# Patient Record
Sex: MALE
[De-identification: names, ages, dates, MRNs, and addresses within clinical notes are randomized per-mention and may not be internally consistent; named-entity substitution may affect disease eponyms.]

---

## 2019-09-20 ENCOUNTER — HOSPITAL ENCOUNTER (EMERGENCY)
Dept: HOSPITAL 92 - ERS | Age: 34
Discharge: HOME | End: 2019-09-20
Payer: SELF-PAY

## 2019-09-20 DIAGNOSIS — K80.20: Primary | ICD-10-CM

## 2019-09-20 LAB
ALBUMIN SERPL BCG-MCNC: 4.5 G/DL (ref 3.5–5)
ALP SERPL-CCNC: 88 U/L (ref 40–150)
ALT SERPL W P-5'-P-CCNC: 104 U/L (ref 8–55)
ANION GAP SERPL CALC-SCNC: 16 MMOL/L (ref 10–20)
AST SERPL-CCNC: 63 U/L (ref 5–34)
BASOPHILS # BLD AUTO: 0 THOU/UL (ref 0–0.2)
BASOPHILS NFR BLD AUTO: 0.5 % (ref 0–1)
BILIRUB SERPL-MCNC: 0.3 MG/DL (ref 0.2–1.2)
BUN SERPL-MCNC: 13 MG/DL (ref 8.9–20.6)
CALCIUM SERPL-MCNC: 9.4 MG/DL (ref 7.8–10.44)
CHLORIDE SERPL-SCNC: 105 MMOL/L (ref 98–107)
CO2 SERPL-SCNC: 23 MMOL/L (ref 22–29)
CREAT CL PREDICTED SERPL C-G-VRATE: 0 ML/MIN (ref 70–130)
EOSINOPHIL # BLD AUTO: 0.2 THOU/UL (ref 0–0.7)
EOSINOPHIL NFR BLD AUTO: 1.7 % (ref 0–10)
GLOBULIN SER CALC-MCNC: 3.3 G/DL (ref 2.4–3.5)
GLUCOSE SERPL-MCNC: 135 MG/DL (ref 70–105)
HGB BLD-MCNC: 14.7 G/DL (ref 14–18)
LIPASE SERPL-CCNC: 22 U/L (ref 8–78)
LYMPHOCYTES # BLD: 3.3 THOU/UL (ref 1.2–3.4)
LYMPHOCYTES NFR BLD AUTO: 33.8 % (ref 21–51)
MCH RBC QN AUTO: 32.3 PG (ref 27–31)
MCV RBC AUTO: 97 FL (ref 78–98)
MONOCYTES # BLD AUTO: 0.7 THOU/UL (ref 0.11–0.59)
MONOCYTES NFR BLD AUTO: 6.9 % (ref 0–10)
NEUTROPHILS # BLD AUTO: 5.6 THOU/UL (ref 1.4–6.5)
NEUTROPHILS NFR BLD AUTO: 57.2 % (ref 42–75)
PLATELET # BLD AUTO: 186 THOU/UL (ref 130–400)
POTASSIUM SERPL-SCNC: 3.9 MMOL/L (ref 3.5–5.1)
RBC # BLD AUTO: 4.56 MILL/UL (ref 4.7–6.1)
SODIUM SERPL-SCNC: 140 MMOL/L (ref 136–145)
WBC # BLD AUTO: 9.8 THOU/UL (ref 4.8–10.8)

## 2019-09-20 PROCEDURE — 80053 COMPREHEN METABOLIC PANEL: CPT

## 2019-09-20 PROCEDURE — 81003 URINALYSIS AUTO W/O SCOPE: CPT

## 2019-09-20 PROCEDURE — 96375 TX/PRO/DX INJ NEW DRUG ADDON: CPT

## 2019-09-20 PROCEDURE — 85025 COMPLETE CBC W/AUTO DIFF WBC: CPT

## 2019-09-20 PROCEDURE — 83690 ASSAY OF LIPASE: CPT

## 2019-09-20 PROCEDURE — 96374 THER/PROPH/DIAG INJ IV PUSH: CPT

## 2019-09-20 PROCEDURE — 76705 ECHO EXAM OF ABDOMEN: CPT

## 2019-09-20 PROCEDURE — 96361 HYDRATE IV INFUSION ADD-ON: CPT

## 2019-09-20 NOTE — ULT
PRELIMINARY REPORT/[facility]/EMERGENCY AFTER

HOURS PROCEDURE: 

 

PROCEDURE INFORMATION:

Exam: US Abdomen Limited, Right Upper Quadrant

 

Exam date and time: 9/20/2019 3:12 AM

Clinical history: 34 years old, male; Abdominal pain; Localized; Right upper quadrant (ruq)

 

TECHNIQUE:

Imaging protocol: Real-time ultrasound of the abdomen with image documentation. Examination was focus
ed on the right upper quadrant.

 

COMPARISON:

No relevant prior studies available.

 

FINDINGS:

Liver: Hepatic steatosis.

Gallbladder: Sonographic Quevedo sign negative. Cholelithiasis/Gallbladder sludge . No gallbladder

wall thickening or pericholecystic fluid.

Common bile duct: See Right Kidney Finding.

Pancreas: Visualized pancreas is unremarkable.

Right kidney: Mild distention of the right renal pelvis may signify mild hydronephrosis. No discernib
le

calyceal dilatation.

 

IMPRESSION:

1. Cholelithiasis/gallbladder sludge. No cholecystitis.

2. Mild distention of the right renal pelvis may signify mild hydronephrosis. No discernible calyceal
 dilatation.

 

Thank you for allowing us to participate in the care of your patient.

Dictated and Authenticated by: Loc Paredes MD

09/20/2019 3:40 AM Central Time ([place] & [place])

 

 

 

 

FINAL REPORT 

 

GALLBLADDER ULTRASOUND:

 

Date:  09/20/19 

 

FINDINGS/IMPRESSION: 

Echogenic material in the gallbladder lumen consistent with dense sludge, sludge balls, or small ston
es. 

 

Mild dilatation of right renal pelvis is noted. Liver is echogenic consistent with steatosis. 

 

I am in agreement with the preliminary report issued by Luis E. 

 

 

POS: [facility]